# Patient Record
Sex: FEMALE | URBAN - METROPOLITAN AREA
[De-identification: names, ages, dates, MRNs, and addresses within clinical notes are randomized per-mention and may not be internally consistent; named-entity substitution may affect disease eponyms.]

---

## 2018-06-28 ENCOUNTER — APPOINTMENT (RX ONLY)
Dept: URBAN - METROPOLITAN AREA CLINIC 26 | Facility: CLINIC | Age: 78
Setting detail: DERMATOLOGY
End: 2018-06-28

## 2018-06-28 PROBLEM — C44.319 BASAL CELL CARCINOMA OF SKIN OF OTHER PARTS OF FACE: Status: ACTIVE | Noted: 2018-06-28

## 2018-06-28 PROCEDURE — ? POST-OP WOUND CHECK (NO GLOBAL PERIOD)

## 2018-06-28 PROCEDURE — 99212 OFFICE O/P EST SF 10 MIN: CPT

## 2018-06-28 NOTE — PROCEDURE: POST-OP WOUND CHECK (NO GLOBAL PERIOD)
Additional Comments: Patient was advised to discontinue bandages and Vaseline (unless outdoors with UV exposure), but to use a moisturizing cream on healing wound.  Sun protection reviewed in detail.
Detail Level: Detailed
Wound Evaluated By: Krishna Deng M.D.\\n\\nWound care instructions were reviewed in detail.